# Patient Record
Sex: MALE | Race: WHITE | NOT HISPANIC OR LATINO | Employment: FULL TIME | URBAN - METROPOLITAN AREA
[De-identification: names, ages, dates, MRNs, and addresses within clinical notes are randomized per-mention and may not be internally consistent; named-entity substitution may affect disease eponyms.]

---

## 2023-08-18 ENCOUNTER — OFFICE VISIT (OUTPATIENT)
Dept: PRIMARY CARE | Facility: CLINIC | Age: 38
End: 2023-08-18
Payer: COMMERCIAL

## 2023-08-18 VITALS
WEIGHT: 219.6 LBS | DIASTOLIC BLOOD PRESSURE: 58 MMHG | HEART RATE: 86 BPM | OXYGEN SATURATION: 98 % | TEMPERATURE: 98.6 F | SYSTOLIC BLOOD PRESSURE: 105 MMHG | HEIGHT: 70 IN | RESPIRATION RATE: 16 BRPM | BODY MASS INDEX: 31.44 KG/M2

## 2023-08-18 DIAGNOSIS — F12.90 MARIJUANA USE: ICD-10-CM

## 2023-08-18 DIAGNOSIS — F17.200 SMOKER: ICD-10-CM

## 2023-08-18 DIAGNOSIS — Z23 ENCOUNTER FOR IMMUNIZATION: ICD-10-CM

## 2023-08-18 DIAGNOSIS — Z00.00 HEALTHCARE MAINTENANCE: Primary | ICD-10-CM

## 2023-08-18 DIAGNOSIS — E66.9 OBESITY (BMI 30.0-34.9): ICD-10-CM

## 2023-08-18 DIAGNOSIS — R19.7 DIARRHEA, UNSPECIFIED TYPE: ICD-10-CM

## 2023-08-18 PROCEDURE — 99395 PREV VISIT EST AGE 18-39: CPT | Performed by: NURSE PRACTITIONER

## 2023-08-18 PROCEDURE — 90715 TDAP VACCINE 7 YRS/> IM: CPT | Performed by: NURSE PRACTITIONER

## 2023-08-18 PROCEDURE — 90471 IMMUNIZATION ADMIN: CPT | Performed by: NURSE PRACTITIONER

## 2023-08-18 NOTE — PROGRESS NOTES
"Subjective   Patient ID: Jer Pyle is a 37 y.o. male who presents for Annual Exam.    HPI     Review of Systems    Objective   /58   Pulse 86   Temp 37 °C (98.6 °F) (Temporal)   Resp 16   Ht 1.778 m (5' 10\")   Wt 99.6 kg (219 lb 9.6 oz)   SpO2 98%   BMI 31.51 kg/m²     Physical Exam    Assessment/Plan          "

## 2023-08-24 ENCOUNTER — CLINICAL SUPPORT (OUTPATIENT)
Dept: PRIMARY CARE | Facility: CLINIC | Age: 38
End: 2023-08-24
Payer: COMMERCIAL

## 2023-08-24 DIAGNOSIS — Z00.00 HEALTHCARE MAINTENANCE: ICD-10-CM

## 2023-08-24 DIAGNOSIS — E66.9 OBESITY (BMI 30.0-34.9): ICD-10-CM

## 2023-08-24 LAB
ALANINE AMINOTRANSFERASE (SGPT) (U/L) IN SER/PLAS: 22 U/L (ref 10–52)
ALBUMIN (G/DL) IN SER/PLAS: 4.8 G/DL (ref 3.4–5)
ALKALINE PHOSPHATASE (U/L) IN SER/PLAS: 72 U/L (ref 33–120)
ANION GAP IN SER/PLAS: 14 MMOL/L (ref 10–20)
ASPARTATE AMINOTRANSFERASE (SGOT) (U/L) IN SER/PLAS: 19 U/L (ref 9–39)
BASOPHILS (10*3/UL) IN BLOOD BY AUTOMATED COUNT: 0.02 X10E9/L (ref 0–0.1)
BASOPHILS/100 LEUKOCYTES IN BLOOD BY AUTOMATED COUNT: 0.2 % (ref 0–2)
BILIRUBIN TOTAL (MG/DL) IN SER/PLAS: 0.5 MG/DL (ref 0–1.2)
CALCIUM (MG/DL) IN SER/PLAS: 10 MG/DL (ref 8.6–10.6)
CARBON DIOXIDE, TOTAL (MMOL/L) IN SER/PLAS: 28 MMOL/L (ref 21–32)
CHLORIDE (MMOL/L) IN SER/PLAS: 105 MMOL/L (ref 98–107)
CHOLESTEROL (MG/DL) IN SER/PLAS: 217 MG/DL (ref 0–199)
CHOLESTEROL IN HDL (MG/DL) IN SER/PLAS: 34.3 MG/DL
CHOLESTEROL/HDL RATIO: 6.3
CREATININE (MG/DL) IN SER/PLAS: 0.9 MG/DL (ref 0.5–1.3)
EOSINOPHILS (10*3/UL) IN BLOOD BY AUTOMATED COUNT: 0.05 X10E9/L (ref 0–0.7)
EOSINOPHILS/100 LEUKOCYTES IN BLOOD BY AUTOMATED COUNT: 0.6 % (ref 0–6)
ERYTHROCYTE DISTRIBUTION WIDTH (RATIO) BY AUTOMATED COUNT: 14.6 % (ref 11.5–14.5)
ERYTHROCYTE MEAN CORPUSCULAR HEMOGLOBIN CONCENTRATION (G/DL) BY AUTOMATED: 33.6 G/DL (ref 32–36)
ERYTHROCYTE MEAN CORPUSCULAR VOLUME (FL) BY AUTOMATED COUNT: 94 FL (ref 80–100)
ERYTHROCYTES (10*6/UL) IN BLOOD BY AUTOMATED COUNT: 4.73 X10E12/L (ref 4.5–5.9)
GFR MALE: >90 ML/MIN/1.73M2
GLUCOSE (MG/DL) IN SER/PLAS: 108 MG/DL (ref 74–99)
HEMATOCRIT (%) IN BLOOD BY AUTOMATED COUNT: 44.4 % (ref 41–52)
HEMOGLOBIN (G/DL) IN BLOOD: 14.9 G/DL (ref 13.5–17.5)
IMMATURE GRANULOCYTES/100 LEUKOCYTES IN BLOOD BY AUTOMATED COUNT: 0.2 % (ref 0–0.9)
LDL: 158 MG/DL (ref 0–99)
LEUKOCYTES (10*3/UL) IN BLOOD BY AUTOMATED COUNT: 8.2 X10E9/L (ref 4.4–11.3)
LYMPHOCYTES (10*3/UL) IN BLOOD BY AUTOMATED COUNT: 2.53 X10E9/L (ref 1.2–4.8)
LYMPHOCYTES/100 LEUKOCYTES IN BLOOD BY AUTOMATED COUNT: 30.9 % (ref 13–44)
MONOCYTES (10*3/UL) IN BLOOD BY AUTOMATED COUNT: 0.62 X10E9/L (ref 0.1–1)
MONOCYTES/100 LEUKOCYTES IN BLOOD BY AUTOMATED COUNT: 7.6 % (ref 2–10)
NEUTROPHILS (10*3/UL) IN BLOOD BY AUTOMATED COUNT: 4.95 X10E9/L (ref 1.2–7.7)
NEUTROPHILS/100 LEUKOCYTES IN BLOOD BY AUTOMATED COUNT: 60.5 % (ref 40–80)
NRBC (PER 100 WBCS) BY AUTOMATED COUNT: 0 /100 WBC (ref 0–0)
PLATELETS (10*3/UL) IN BLOOD AUTOMATED COUNT: 284 X10E9/L (ref 150–450)
POTASSIUM (MMOL/L) IN SER/PLAS: 4.6 MMOL/L (ref 3.5–5.3)
PROTEIN TOTAL: 7.9 G/DL (ref 6.4–8.2)
SODIUM (MMOL/L) IN SER/PLAS: 142 MMOL/L (ref 136–145)
TRIGLYCERIDE (MG/DL) IN SER/PLAS: 126 MG/DL (ref 0–149)
UREA NITROGEN (MG/DL) IN SER/PLAS: 10 MG/DL (ref 6–23)
VLDL: 25 MG/DL (ref 0–40)

## 2023-08-24 PROCEDURE — 84443 ASSAY THYROID STIM HORMONE: CPT

## 2023-08-24 PROCEDURE — 80053 COMPREHEN METABOLIC PANEL: CPT

## 2023-08-24 PROCEDURE — 84402 ASSAY OF FREE TESTOSTERONE: CPT

## 2023-08-24 PROCEDURE — 84403 ASSAY OF TOTAL TESTOSTERONE: CPT

## 2023-08-24 PROCEDURE — 80061 LIPID PANEL: CPT

## 2023-08-24 PROCEDURE — 85025 COMPLETE CBC W/AUTO DIFF WBC: CPT

## 2023-08-24 PROCEDURE — 84439 ASSAY OF FREE THYROXINE: CPT

## 2023-08-25 LAB
THYROTROPIN (MIU/L) IN SER/PLAS BY DETECTION LIMIT <= 0.05 MIU/L: 0.37 MIU/L (ref 0.44–3.98)
THYROXINE (T4) FREE (NG/DL) IN SER/PLAS: 0.88 NG/DL (ref 0.78–1.48)

## 2023-08-29 LAB
TESTOSTERONE FREE (CHAN): 103 PG/ML (ref 35–155)
TESTOSTERONE,TOTAL,LC-MS/MS: 495 NG/DL (ref 250–1100)

## 2023-08-30 NOTE — RESULT ENCOUNTER NOTE
Please call the patient. Stable labs. Cholesterol and fasting glucose are elevated. [facility] healthy diet and exercise. Testosterone is normal. Repeat labs in 6-12 months. TY

## 2023-11-13 ENCOUNTER — OFFICE VISIT (OUTPATIENT)
Dept: PRIMARY CARE | Facility: CLINIC | Age: 38
End: 2023-11-13
Payer: COMMERCIAL

## 2023-11-13 VITALS
WEIGHT: 212 LBS | DIASTOLIC BLOOD PRESSURE: 65 MMHG | TEMPERATURE: 98 F | BODY MASS INDEX: 30.42 KG/M2 | HEART RATE: 87 BPM | OXYGEN SATURATION: 98 % | SYSTOLIC BLOOD PRESSURE: 128 MMHG | RESPIRATION RATE: 18 BRPM

## 2023-11-13 DIAGNOSIS — R10.84 GENERALIZED ABDOMINAL PAIN: ICD-10-CM

## 2023-11-13 DIAGNOSIS — K62.5 BRBPR (BRIGHT RED BLOOD PER RECTUM): Primary | ICD-10-CM

## 2023-11-13 DIAGNOSIS — R19.7 DIARRHEA, UNSPECIFIED TYPE: ICD-10-CM

## 2023-11-13 PROBLEM — K92.2 GI BLEED: Status: ACTIVE | Noted: 2023-11-13

## 2023-11-13 PROBLEM — F12.91 HISTORY OF MARIJUANA USE: Status: ACTIVE | Noted: 2023-11-13

## 2023-11-13 PROBLEM — Z87.11 HISTORY OF PEPTIC ULCER: Status: ACTIVE | Noted: 2023-11-13

## 2023-11-13 PROBLEM — F22 PARANOIA (MULTI): Status: RESOLVED | Noted: 2023-11-13 | Resolved: 2023-11-13

## 2023-11-13 PROBLEM — R10.9 STOMACH ACHE: Status: ACTIVE | Noted: 2023-11-13

## 2023-11-13 PROBLEM — J02.0 STREP THROAT: Status: RESOLVED | Noted: 2023-06-12 | Resolved: 2023-11-13

## 2023-11-13 PROBLEM — F14.11 HISTORY OF COCAINE ABUSE (MULTI): Status: ACTIVE | Noted: 2017-07-31

## 2023-11-13 PROBLEM — F17.200 NICOTINE DEPENDENCE: Status: RESOLVED | Noted: 2023-11-13 | Resolved: 2023-11-13

## 2023-11-13 PROBLEM — F17.200 NICOTINE DEPENDENCE: Status: ACTIVE | Noted: 2023-11-13

## 2023-11-13 PROBLEM — K92.0 HEMATEMESIS: Status: ACTIVE | Noted: 2023-11-13

## 2023-11-13 PROBLEM — R79.89 ABNORMAL LFTS: Status: ACTIVE | Noted: 2023-11-13

## 2023-11-13 PROBLEM — Z90.49 HISTORY OF CHOLECYSTECTOMY: Status: ACTIVE | Noted: 2023-11-13

## 2023-11-13 PROBLEM — B18.1 CHRONIC HEPATITIS B (MULTI): Status: ACTIVE | Noted: 2017-08-02

## 2023-11-13 PROBLEM — F11.20 HEROIN DEPENDENCE (MULTI): Status: ACTIVE | Noted: 2017-07-31

## 2023-11-13 PROBLEM — J02.0 STREP THROAT: Status: ACTIVE | Noted: 2023-06-12

## 2023-11-13 PROBLEM — F22 PARANOIA (MULTI): Status: ACTIVE | Noted: 2023-11-13

## 2023-11-13 PROBLEM — S52.502A DISTAL RADIUS FRACTURE, LEFT: Status: ACTIVE | Noted: 2018-05-07

## 2023-11-13 PROBLEM — R19.4 CHANGE IN BOWEL HABITS: Status: ACTIVE | Noted: 2023-11-13

## 2023-11-13 PROBLEM — E05.90 SUBCLINICAL HYPERTHYROIDISM: Status: ACTIVE | Noted: 2023-11-13

## 2023-11-13 PROBLEM — B27.90 MONONUCLEOSIS: Status: RESOLVED | Noted: 2023-06-12 | Resolved: 2023-11-13

## 2023-11-13 PROBLEM — F41.8 DEPRESSION WITH ANXIETY: Status: ACTIVE | Noted: 2023-11-13

## 2023-11-13 PROBLEM — F11.11 HISTORY OF HEROIN ABUSE (MULTI): Status: ACTIVE | Noted: 2017-07-31

## 2023-11-13 PROBLEM — R10.9 ABDOMINAL PAIN: Status: ACTIVE | Noted: 2023-11-13

## 2023-11-13 PROBLEM — Z86.19 HISTORY OF HEPATITIS C: Status: ACTIVE | Noted: 2017-08-02

## 2023-11-13 PROBLEM — K92.1 TARRY STOOLS: Status: RESOLVED | Noted: 2023-11-13 | Resolved: 2023-11-13

## 2023-11-13 PROBLEM — R10.9 STOMACH ACHE: Status: RESOLVED | Noted: 2023-11-13 | Resolved: 2023-11-13

## 2023-11-13 PROBLEM — B18.1 CHRONIC HEPATITIS B (MULTI): Status: RESOLVED | Noted: 2017-08-02 | Resolved: 2023-11-13

## 2023-11-13 PROBLEM — F17.200 TOBACCO USE DISORDER: Status: ACTIVE | Noted: 2017-09-27

## 2023-11-13 PROBLEM — F14.10 COCAINE ABUSE (MULTI): Status: ACTIVE | Noted: 2017-07-31

## 2023-11-13 PROBLEM — K92.0 HEMATEMESIS: Status: RESOLVED | Noted: 2023-11-13 | Resolved: 2023-11-13

## 2023-11-13 PROBLEM — B27.90 MONONUCLEOSIS: Status: ACTIVE | Noted: 2023-06-12

## 2023-11-13 PROBLEM — R10.9 ABDOMINAL PAIN: Status: RESOLVED | Noted: 2023-11-13 | Resolved: 2023-11-13

## 2023-11-13 PROBLEM — S52.502A DISTAL RADIUS FRACTURE, LEFT: Status: RESOLVED | Noted: 2018-05-07 | Resolved: 2023-11-13

## 2023-11-13 PROBLEM — K92.1 TARRY STOOLS: Status: ACTIVE | Noted: 2023-11-13

## 2023-11-13 PROBLEM — R19.4 CHANGE IN BOWEL HABITS: Status: RESOLVED | Noted: 2023-11-13 | Resolved: 2023-11-13

## 2023-11-13 PROCEDURE — 99214 OFFICE O/P EST MOD 30 MIN: CPT | Performed by: FAMILY MEDICINE

## 2023-11-13 NOTE — PROGRESS NOTES
Subjective   Patient ID: Jer Pyle is a 38 y.o. male who presents for Rectal Bleeding (And itching).  HPI    Bright red blood per rectum, usually about 1 hour after BM, starts     Had colonoscopy a few years ago after eating bones.      Has been have interemittent diarrhea for a few mos with lower abd pain.  No fevers or chills, no nausea or vomiting      Review of Systems    Objective   Physical Exam  Constitutional:       Appearance: Normal appearance.   Cardiovascular:      Rate and Rhythm: Normal rate and regular rhythm.   Pulmonary:      Effort: Pulmonary effort is normal.      Breath sounds: Normal breath sounds.   Abdominal:      General: Abdomen is flat. Bowel sounds are normal.      Palpations: Abdomen is soft.   Skin:     General: Skin is warm and dry.   Neurological:      Mental Status: He is alert.   Psychiatric:         Mood and Affect: Mood normal.         Behavior: Behavior normal.         Assessment/Plan   Problem List Items Addressed This Visit       Abdominal pain    Relevant Orders    Referral to Gastroenterology    CBC and Auto Differential    Celiac Panel    Comprehensive Metabolic Panel    BRBPR (bright red blood per rectum) - Primary    Relevant Orders    Referral to Gastroenterology    CBC and Auto Differential    Celiac Panel    Comprehensive Metabolic Panel    Diarrhea    Relevant Orders    Referral to Gastroenterology    CBC and Auto Differential    Celiac Panel    Comprehensive Metabolic Panel

## 2023-11-29 ENCOUNTER — APPOINTMENT (OUTPATIENT)
Dept: GASTROENTEROLOGY | Facility: CLINIC | Age: 38
End: 2023-11-29
Payer: COMMERCIAL

## 2024-01-12 ENCOUNTER — OFFICE VISIT (OUTPATIENT)
Dept: PRIMARY CARE | Facility: CLINIC | Age: 39
End: 2024-01-12
Payer: COMMERCIAL

## 2024-01-12 VITALS
OXYGEN SATURATION: 98 % | DIASTOLIC BLOOD PRESSURE: 89 MMHG | SYSTOLIC BLOOD PRESSURE: 123 MMHG | TEMPERATURE: 97.6 F | BODY MASS INDEX: 30.28 KG/M2 | HEART RATE: 72 BPM | WEIGHT: 211 LBS

## 2024-01-12 DIAGNOSIS — R09.81 NASAL CONGESTION: ICD-10-CM

## 2024-01-12 DIAGNOSIS — F17.200 SMOKER: ICD-10-CM

## 2024-01-12 DIAGNOSIS — J01.00 ACUTE NON-RECURRENT MAXILLARY SINUSITIS: Primary | ICD-10-CM

## 2024-01-12 DIAGNOSIS — H93.8X1 PRESSURE SENSATION IN RIGHT EAR: ICD-10-CM

## 2024-01-12 PROCEDURE — 99213 OFFICE O/P EST LOW 20 MIN: CPT | Performed by: NURSE PRACTITIONER

## 2024-01-12 RX ORDER — AMOXICILLIN AND CLAVULANATE POTASSIUM 875; 125 MG/1; MG/1
875 TABLET, FILM COATED ORAL 2 TIMES DAILY
Qty: 20 TABLET | Refills: 0 | Status: SHIPPED | OUTPATIENT
Start: 2024-01-12 | End: 2024-01-22

## 2024-01-12 ASSESSMENT — ENCOUNTER SYMPTOMS
LIGHT-HEADEDNESS: 0
FACIAL SWELLING: 0
ADENOPATHY: 0
NAUSEA: 0
DIZZINESS: 0
PHOTOPHOBIA: 0
HEADACHES: 0
FEVER: 0
BRUISES/BLEEDS EASILY: 0
ACTIVITY CHANGE: 0
COUGH: 0
EYE ITCHING: 0
APPETITE CHANGE: 0
EYE DISCHARGE: 0
WEAKNESS: 0
TROUBLE SWALLOWING: 0
SORE THROAT: 0
VOMITING: 0
SHORTNESS OF BREATH: 0
VOICE CHANGE: 0
UNEXPECTED WEIGHT CHANGE: 0
CHILLS: 0
SINUS PRESSURE: 1
RHINORRHEA: 0
CHEST TIGHTNESS: 0
STRIDOR: 0
EYE PAIN: 0
DIAPHORESIS: 0
EYE REDNESS: 0
DIARRHEA: 0
APNEA: 0
WHEEZING: 0
CHOKING: 0
PALPITATIONS: 0
FATIGUE: 0
ABDOMINAL PAIN: 0
SINUS PAIN: 0

## 2024-01-12 NOTE — PROGRESS NOTES
Subjective   Patient ID: Jer Pyle is a 38 y.o. male who presents for sinus symptoms.    Ear Fullness   Pertinent negatives include no abdominal pain, coughing, diarrhea, ear discharge, headaches, rhinorrhea, sore throat or vomiting.      Patient in office with nasal congestion and right ear pressure x 9 days. Smoker. He has no other symptoms or concerns.     Review of Systems   Constitutional:  Negative for activity change, appetite change, chills, diaphoresis, fatigue, fever and unexpected weight change.   HENT:  Positive for congestion and sinus pressure. Negative for dental problem, drooling, ear discharge, facial swelling, mouth sores, nosebleeds, postnasal drip, rhinorrhea, sinus pain, sore throat, tinnitus, trouble swallowing and voice change.         Right ear pressure with decreased hearing   Eyes:  Negative for photophobia, pain, discharge, redness, itching and visual disturbance.   Respiratory:  Negative for apnea, cough, choking, chest tightness, shortness of breath, wheezing and stridor.    Cardiovascular:  Negative for chest pain, palpitations and leg swelling.   Gastrointestinal:  Negative for abdominal pain, diarrhea, nausea and vomiting.   Musculoskeletal:  Negative for gait problem.   Neurological:  Negative for dizziness, syncope, weakness, light-headedness and headaches.   Hematological:  Negative for adenopathy. Does not bruise/bleed easily.     Objective   /89   Pulse 72   Temp 36.4 °C (97.6 °F) (Temporal)   Wt 95.7 kg (211 lb)   SpO2 98%   BMI 30.28 kg/m²     Physical Exam  Constitutional:       Appearance: Normal appearance.   HENT:      Head: Normocephalic.      Right Ear: Ear canal and external ear normal. There is no impacted cerumen.      Left Ear: Tympanic membrane, ear canal and external ear normal. There is no impacted cerumen.      Ears:      Comments: Mild bulging of right TM      Nose: Congestion present.      Mouth/Throat:      Pharynx: Oropharynx is clear. No  oropharyngeal exudate or posterior oropharyngeal erythema.   Eyes:      Conjunctiva/sclera: Conjunctivae normal.   Cardiovascular:      Rate and Rhythm: Normal rate and regular rhythm.      Pulses: Normal pulses.      Heart sounds: Normal heart sounds.   Pulmonary:      Effort: Pulmonary effort is normal.      Breath sounds: Normal breath sounds.   Musculoskeletal:      Cervical back: Neck supple. No tenderness.   Lymphadenopathy:      Cervical: No cervical adenopathy.   Skin:     Coloration: Skin is not jaundiced or pale.   Neurological:      General: No focal deficit present.      Mental Status: He is alert.       Assessment/Plan     Exam findings reviewed with patient. Medications discussed. May use OTC Flonase as needed for nasal congestion. Advocated rest, proper hydration, and smoking cessation. Follow up in 1 week or earlier if no improvement.

## 2024-04-19 ENCOUNTER — OFFICE VISIT (OUTPATIENT)
Dept: PRIMARY CARE | Facility: CLINIC | Age: 39
End: 2024-04-19
Payer: COMMERCIAL

## 2024-04-19 VITALS
HEART RATE: 73 BPM | HEIGHT: 70 IN | OXYGEN SATURATION: 97 % | RESPIRATION RATE: 16 BRPM | DIASTOLIC BLOOD PRESSURE: 68 MMHG | WEIGHT: 210.8 LBS | BODY MASS INDEX: 30.18 KG/M2 | TEMPERATURE: 97.8 F | SYSTOLIC BLOOD PRESSURE: 109 MMHG

## 2024-04-19 DIAGNOSIS — R19.09 GROIN MASS: ICD-10-CM

## 2024-04-19 DIAGNOSIS — K40.90 NON-RECURRENT UNILATERAL INGUINAL HERNIA WITHOUT OBSTRUCTION OR GANGRENE: Primary | ICD-10-CM

## 2024-04-19 PROCEDURE — 99213 OFFICE O/P EST LOW 20 MIN: CPT | Performed by: NURSE PRACTITIONER

## 2024-04-19 ASSESSMENT — ENCOUNTER SYMPTOMS
RECTAL PAIN: 0
DIZZINESS: 0
BRUISES/BLEEDS EASILY: 0
HEADACHES: 0
WEAKNESS: 0
FLANK PAIN: 0
CHEST TIGHTNESS: 0
COLOR CHANGE: 0
CONSTIPATION: 0
WHEEZING: 0
FATIGUE: 0
APPETITE CHANGE: 0
FREQUENCY: 0
DIFFICULTY URINATING: 0
ABDOMINAL DISTENTION: 0
ACTIVITY CHANGE: 0
DIAPHORESIS: 0
COUGH: 0
CHILLS: 0
CHOKING: 0
UNEXPECTED WEIGHT CHANGE: 0
VOMITING: 0
BLOOD IN STOOL: 0
DYSURIA: 0
NAUSEA: 0
SHORTNESS OF BREATH: 0
ABDOMINAL PAIN: 0
FEVER: 0
PALPITATIONS: 0
WOUND: 0
BACK PAIN: 0
STRIDOR: 0
ADENOPATHY: 0
DIARRHEA: 0
HEMATURIA: 0
APNEA: 0

## 2024-04-19 NOTE — PROGRESS NOTES
"Subjective   Patient ID: Jer Pyle is a 38 y.o. male who presents for mass in groin area x 1 month.     HPI   Patient in office with concern of painless, reducible mass in right groin area x 1 month. He has no other symptoms or concerns.     Review of Systems   Constitutional:  Negative for activity change, appetite change, chills, diaphoresis, fatigue, fever and unexpected weight change.   Respiratory:  Negative for apnea, cough, choking, chest tightness, shortness of breath, wheezing and stridor.    Cardiovascular:  Negative for chest pain, palpitations and leg swelling.   Gastrointestinal:  Negative for abdominal distention, abdominal pain, blood in stool, constipation, diarrhea, nausea, rectal pain and vomiting.        Mass in groin area   Genitourinary:  Negative for decreased urine volume, difficulty urinating, dysuria, flank pain, frequency, hematuria, scrotal swelling, testicular pain and urgency.   Musculoskeletal:  Negative for back pain.   Skin:  Negative for color change, rash and wound.   Neurological:  Negative for dizziness, syncope, weakness and headaches.   Hematological:  Negative for adenopathy. Does not bruise/bleed easily.     Objective   /68   Pulse 73   Temp 36.6 °C (97.8 °F) (Temporal)   Resp 16   Ht 1.778 m (5' 10\")   Wt 95.6 kg (210 lb 12.8 oz)   SpO2 97%   BMI 30.25 kg/m²     Physical Exam  Constitutional:       Appearance: Normal appearance.   Cardiovascular:      Rate and Rhythm: Normal rate and regular rhythm.      Pulses: Normal pulses.   Pulmonary:      Effort: Pulmonary effort is normal.      Breath sounds: Normal breath sounds.   Abdominal:      General: Bowel sounds are normal. There is no distension.      Palpations: Abdomen is soft. There is no mass.      Tenderness: There is no abdominal tenderness. There is no right CVA tenderness, left CVA tenderness, guarding or rebound.      Hernia: A hernia is present.      Comments: 5 cm non-tender hernia palpated in " right upper groin area; non-tender; reducible   Skin:     Coloration: Skin is not jaundiced or pale.      Findings: No bruising, erythema or rash.   Neurological:      General: No focal deficit present.      Mental Status: He is alert.      Gait: Gait normal.       Assessment/Plan     Exam findings reviewed with patient. Referral discussed. Etiology of hernia and general precautions discussed. Avoid heavy lifting or strenuous exercise until cleared by surgeon. Emergent signs and symptoms reviewed; patient verbalized understanding. Patient knows where to get emergent help. Follow up in 4 months for a physical or earlier as needed.

## 2024-07-02 PROCEDURE — RXMED WILLOW AMBULATORY MEDICATION CHARGE

## 2024-07-03 ENCOUNTER — PHARMACY VISIT (OUTPATIENT)
Dept: PHARMACY | Facility: CLINIC | Age: 39
End: 2024-07-03
Payer: COMMERCIAL

## 2024-08-20 ENCOUNTER — APPOINTMENT (OUTPATIENT)
Dept: PRIMARY CARE | Facility: CLINIC | Age: 39
End: 2024-08-20
Payer: COMMERCIAL